# Patient Record
Sex: MALE | Race: BLACK OR AFRICAN AMERICAN | URBAN - METROPOLITAN AREA
[De-identification: names, ages, dates, MRNs, and addresses within clinical notes are randomized per-mention and may not be internally consistent; named-entity substitution may affect disease eponyms.]

---

## 2021-03-29 ENCOUNTER — OFFICE VISIT (OUTPATIENT)
Dept: ENDOCRINOLOGY | Age: 31
End: 2021-03-29
Payer: MEDICAID

## 2021-03-29 VITALS
HEIGHT: 72 IN | HEART RATE: 80 BPM | SYSTOLIC BLOOD PRESSURE: 148 MMHG | TEMPERATURE: 98.9 F | DIASTOLIC BLOOD PRESSURE: 77 MMHG | OXYGEN SATURATION: 100 % | BODY MASS INDEX: 42.66 KG/M2 | RESPIRATION RATE: 18 BRPM | WEIGHT: 315 LBS

## 2021-03-29 DIAGNOSIS — I15.0 RENOVASCULAR HYPERTENSION: Primary | ICD-10-CM

## 2021-03-29 PROCEDURE — 99204 OFFICE O/P NEW MOD 45 MIN: CPT | Performed by: INTERNAL MEDICINE

## 2021-03-29 RX ORDER — AMLODIPINE BESYLATE 10 MG/1
TABLET ORAL
COMMUNITY
Start: 2021-03-14

## 2021-03-29 RX ORDER — CHLORTHALIDONE 25 MG/1
TABLET ORAL
COMMUNITY
Start: 2021-02-23

## 2021-03-29 RX ORDER — ATORVASTATIN CALCIUM 20 MG/1
20 TABLET, FILM COATED ORAL
COMMUNITY
Start: 2021-01-27

## 2021-03-29 RX ORDER — METOPROLOL SUCCINATE 100 MG/1
TABLET, EXTENDED RELEASE ORAL
COMMUNITY
Start: 2021-02-25

## 2021-03-29 NOTE — PROGRESS NOTES
1. Have you been to the ER, urgent care clinic since your last visit? No Hospitalized since your last visit? No    2. Have you seen or consulted any other health care providers outside of the [address] [place] [address] since your last visit? Include any pap smears or colon screening.  No      Wt Readings from Last 3 Encounters:   03/29/21 (!) 372 lb 6.4 oz (168.9 kg)     Temp Readings from Last 3 Encounters:   03/29/21 98.9 °F (37.2 °C) (Oral)     BP Readings from Last 3 Encounters:   03/29/21 (!) 148/77     Pulse Readings from Last 3 Encounters:   03/29/21 80

## 2021-03-29 NOTE — PATIENT INSTRUCTIONS
SPECIFIC INSTRUCTIONS BELOW No new meds PAY ATTENTION TO THESE GENERAL INSTRUCTIONS  
 
- HEALTH MAINTENANCE IS NOT GOING TO BE UP TO DATE ON YOUR AVS- PLEASE IGNORE  
- YOUR MED LIST IS NOT UP TO DATE AS SOME CHANGES ARE BEING MADE AFTER THE VISIT - FOLLOW SPECIFIC INSTRUCTIONS ABOVE Results *Normal results will not be notified by a phone call starting January 1 2021 *If you have an upcoming visit, the results will be discussed at the visit *Please sign up for MY CHART if you want access to your lab and test results *Abnormal results which require immediate attention will be notified by phone call *Abnormal results which do not require immediate assistance will be notified in 1-2 weeks Refills    -    have your pharmacy send us a refill request 
Phone calls  -  Allow  24 hrs. for non-urgent calls to be returned Prior authorization - It may take 2-4 weeks to process Forms  -  FMLA, DMV etc., will take up to 2 weeks to process Cancellations - please notify the office 2 days in advance Samples  - will only be dispensed at visits  
 
--------------------------------------------------------------------------------------------

## 2021-03-29 NOTE — LETTER
3/29/2021 Patient: Josefa Mcallister YOB: 1990 Date of Visit: 3/29/2021 PAOLO Hamilton 
[place] [phone] Via Fax: [phone] Dear PAOLO Hamilton, Thank you for referring Mr. Pk Jarvis to [facility] for evaluation. My notes for this consultation are attached. If you have questions, please do not hesitate to call me. I look forward to following your patient along with you. Sincerely, Collette Hudson MD

## 2021-03-29 NOTE — PROGRESS NOTES
HISTORY OF PRESENT ILLNESS  Pk Jarvis is a 28 y.o. male. HPI  Initial visit   For   Hyperaldosteronism evaluation     Pt has h/o HTN for many years,  Found to have elevated levels of  aldosterone  -    39     From feb 2021   plasma renin activity     6.029   He is On 3 meds, metoprolol xl 100 mg every day,  hygroten 25 mg a day   And amlodipine 10 mg a day     More compliant since  Nov 2020 , now he says BP in better control   BMI  Of  50       Past Medical History:   Diagnosis Date    Hypertension     Obesity        Social History     Socioeconomic History    Marital status:      Spouse name: Not on file    Number of children: Not on file    Years of education: Not on file    Highest education level: Not on file   Occupational History    Not on file   Social Needs    Financial resource strain: Not on file    Food insecurity     Worry: Not on file     Inability: Not on file    Transportation needs     Medical: Not on file     Non-medical: Not on file   Tobacco Use    Smoking status: Never Smoker    Smokeless tobacco: Never Used   Substance and Sexual Activity    Alcohol use: Never     Frequency: Never    Drug use: Never    Sexual activity: Not on file   Lifestyle    Physical activity     Days per week: Not on file     Minutes per session: Not on file    Stress: Not on file   Relationships    Social connections     Talks on phone: Not on file     Gets together: Not on file     Attends Mandaen service: Not on file     Active member of club or organization: Not on file     Attends meetings of clubs or organizations: Not on file     Relationship status: Not on file    Intimate partner violence     Fear of current or ex partner: Not on file     Emotionally abused: Not on file     Physically abused: Not on file     Forced sexual activity: Not on file   Other Topics Concern    Not on file   Social History Narrative    Not on file       History reviewed.  No pertinent family history. Review of Systems   Constitutional: Positive for malaise/fatigue. HENT: Negative. Eyes: Negative for pain and redness. Respiratory: Negative. Cardiovascular: Negative for chest pain, palpitations and leg swelling. Gastrointestinal: Negative. Negative for constipation. Genitourinary: Negative. Musculoskeletal: Negative for myalgias. Skin: Negative. Neurological: Negative. Endo/Heme/Allergies: Negative. Psychiatric/Behavioral: Negative for depression and memory loss. The patient does not have insomnia. Physical Exam  Constitutional:       Appearance: He is well-developed. He is obese. HENT:      Head: Normocephalic. Eyes:      Conjunctiva/sclera: Conjunctivae normal.      Pupils: Pupils are equal, round, and reactive to light. Neck:      Musculoskeletal: Normal range of motion and neck supple. Cardiovascular:      Rate and Rhythm: Normal rate and regular rhythm. Pulmonary:      Effort: Pulmonary effort is normal.      Breath sounds: Normal breath sounds. Abdominal:      General: Bowel sounds are normal.      Palpations: Abdomen is soft. Musculoskeletal: Normal range of motion. Skin:     General: Skin is warm and dry. Neurological:      Mental Status: He is alert and oriented to person, place, and time. Deep Tendon Reflexes: Reflexes are normal and symmetric. ASSESSMENT and PLAN    1. Hyperaldosteronism - by first labs drawn at pcp   Pt denies any hypokalemia despite being on hygroten   Will repeat labs first     2. Morbidly obese - Body mass index is 50.51 kg/m².   He is working on lowering the calories     F/u in 3 months   Tobi Castañeda MD

## 2021-04-06 LAB
METANEPH FREE SERPL-MCNC: 43.9 PG/ML (ref 0–88)
NORMETANEPHRINE SERPL-MCNC: 165.6 PG/ML (ref 0–110.1)

## 2021-04-13 LAB
ALBUMIN SERPL-MCNC: 4.6 G/DL (ref 4.1–5.2)
ALBUMIN/GLOB SERPL: 1.5 {RATIO} (ref 1.2–2.2)
ALDOST SERPL-MCNC: 9.6 NG/DL (ref 0–30)
ALP SERPL-CCNC: 58 IU/L (ref 39–117)
ALT SERPL-CCNC: 17 IU/L (ref 0–44)
AST SERPL-CCNC: 24 IU/L (ref 0–40)
BILIRUB SERPL-MCNC: 0.6 MG/DL (ref 0–1.2)
BUN SERPL-MCNC: 10 MG/DL (ref 6–20)
BUN/CREAT SERPL: 9 (ref 9–20)
CALCIUM SERPL-MCNC: 9.8 MG/DL (ref 8.7–10.2)
CHLORIDE SERPL-SCNC: 101 MMOL/L (ref 96–106)
CO2 SERPL-SCNC: 25 MMOL/L (ref 20–29)
CREAT SERPL-MCNC: 1.14 MG/DL (ref 0.76–1.27)
GLOBULIN SER CALC-MCNC: 3.1 G/DL (ref 1.5–4.5)
GLUCOSE SERPL-MCNC: 80 MG/DL (ref 65–99)
POTASSIUM SERPL-SCNC: 3.9 MMOL/L (ref 3.5–5.2)
PROT SERPL-MCNC: 7.7 G/DL (ref 6–8.5)
SODIUM SERPL-SCNC: 141 MMOL/L (ref 134–144)

## 2021-04-15 LAB
ALDOST SERPL-MCNC: 7.4 NG/DL (ref 0–30)
RENIN PLAS-CCNC: 1.3 NG/ML/HR (ref 0.17–5.38)

## 2021-04-28 DIAGNOSIS — I15.0 RENOVASCULAR HYPERTENSION: Primary | ICD-10-CM

## 2021-04-29 ENCOUNTER — TELEPHONE (OUTPATIENT)
Dept: ENDOCRINOLOGY | Age: 31
End: 2021-04-29

## 2021-04-29 DIAGNOSIS — I15.0 RENOVASCULAR HYPERTENSION: Primary | ICD-10-CM

## 2021-04-29 NOTE — TELEPHONE ENCOUNTER
Per Dr. Shelli Kaur, informed pt of result note, as noted above. Pt verbalized understanding and would like lab order mailed to him. No further questions or concerns at this time. Lab slips mailed.

## 2021-04-29 NOTE — PROGRESS NOTES
His labs showed one value to be higher - this is from adrenal glands and can potentially make the BP stay higher too   I need a repeat in the morning and collected in a calm sitting state - ordered plasma metanephrines for future

## 2021-04-29 NOTE — TELEPHONE ENCOUNTER
----- Message from Lisette Hines MD sent at 4/28/2021  8:36 PM EDT -----  His labs showed one value to be higher - this is from adrenal glands and can potentially make the BP stay higher too   I need a repeat in the morning and collected in a calm sitting state - ordered plasma metanephrines for future